# Patient Record
Sex: MALE | Race: WHITE | NOT HISPANIC OR LATINO | Employment: UNEMPLOYED | URBAN - METROPOLITAN AREA
[De-identification: names, ages, dates, MRNs, and addresses within clinical notes are randomized per-mention and may not be internally consistent; named-entity substitution may affect disease eponyms.]

---

## 2021-04-21 RX ORDER — SODIUM, POTASSIUM,MAG SULFATES 17.5-3.13G
2 SOLUTION, RECONSTITUTED, ORAL ORAL TAKE AS DIRECTED
Qty: 354 ML | Refills: 0 | Status: SHIPPED | OUTPATIENT
Start: 2021-04-21 | End: 2021-05-12

## 2021-05-08 PROBLEM — F41.1 GAD (GENERALIZED ANXIETY DISORDER): Status: ACTIVE | Noted: 2021-05-08

## 2021-05-08 PROBLEM — G40.909 SEIZURE DISORDER (HCC): Status: ACTIVE | Noted: 2021-05-08

## 2021-05-08 PROBLEM — J44.9 COPD (CHRONIC OBSTRUCTIVE PULMONARY DISEASE) (HCC): Status: ACTIVE | Noted: 2021-05-08

## 2021-05-08 PROBLEM — R07.2 PRECORDIAL PAIN: Status: ACTIVE | Noted: 2021-05-08

## 2021-05-08 NOTE — PROGRESS NOTES
Subjective   Christina Sheffield is a 56 y.o. male.  Primary Care: Rhea Alegria MD  Referring: Rhea Alegria MD  [address] [place] [address]      Chief Complaint   Patient presents with   • Heart Murmur       Patient Active Problem List    Diagnosis Date Noted   • Abnormal EKG 05/08/2021     Priority: High   • Seizure disorder (CMS/[facility]) 05/08/2021   • COPD (chronic obstructive pulmonary disease) (CMS/[facility]) 05/08/2021   • MARITZA (generalized anxiety disorder) 05/08/2021        History of Present Illness   Is a 56-year-old male who is a moderately poor historian.  He was recently seen by primary care his note was reviewed.  At that time his blood pressure was well controlled and heart rate within normal range.  He had an EKG which shows lateral ischemic changes.  He has chronic mild exertional dyspnea which he attributes to COPD.  He has no current complaint exertional chest pain, denies orthopnea, no PND, no claudication no lower extremity edema.  He has no awareness of tachyarrhythmias, denies dizziness or syncope.  He does not take blood pressure medications.  He denies a history of dyslipidemia.  He exercises daily which includes 30 minutes of weight, walking and biking.    Past Surgical History:   Procedure Laterality Date   • HAND SURGERY Left        The following portions of the patient's history were reviewed and updated as appropriate: allergies, current medications, past family history, past medical history, past social history, past surgical history and problem list.    Allergies   Allergen Reactions   • Penicillins Hives         Current Outpatient Medications   Medication Instructions   • buprenorphine-naloxone (SUBOXONE) 8-2 MG per SL tablet 1 tablet, Sublingual, Daily   • buPROPion XL (WELLBUTRIN XL) 150 mg, Oral, Daily   • fluticasone (FLOVENT HFA) 220 MCG/ACT inhaler 1 puff, Inhalation, 2 Times Daily - RT   • levETIRAcetam (KEPPRA) 500 mg, Oral, 2 Times Daily   • montelukast  "(SINGULAIR) 10 mg, Oral, Nightly         Social History     Socioeconomic History   • Marital status: Single     Spouse name: Not on file   • Number of children: Not on file   • Years of education: Not on file   • Highest education level: Not on file   Tobacco Use   • Smoking status: Former Smoker     Quit date:      Years since quittin.3   • Smokeless tobacco: Never Used   Vaping Use   • Vaping Use: Every day   Substance and Sexual Activity   • Alcohol use: Not Currently   • Drug use: Not Currently       Family History   Problem Relation Age of Onset   • Heart attack Mother 58   • No Known Problems Father        Objective      /60 (BP Location: Right arm, Patient Position: Sitting)   Pulse 67   Temp 98.2 °F (36.8 °C)   Ht 170.2 cm (67\")   Wt 55 kg (121 lb 3.2 oz)   SpO2 96%   BMI 18.98 kg/m²     Constitutional:       Appearance: Well-developed.   Pulmonary:      Effort: Pulmonary effort is normal. No respiratory distress.      Breath sounds: Normal breath sounds. No wheezing. No rales.      Comments: Bases clear  Chest:      Chest wall: Not tender to palpatation.   Cardiovascular:      Normal rate. Regular rhythm.      Murmurs: There is no murmur.      No gallop. No click. No rub.   Pulses:     Intact distal pulses.   Edema:     Peripheral edema absent.   Musculoskeletal: Normal range of motion.         ECG 12 Lead    Date/Time: 2021 3:21 PM  Performed by: Khoi Bazan MD  Authorized by: Khio Bazan MD   Previous ECG: no previous ECG available  Rhythm: sinus rhythm  Rate: normal  BPM: 67  Conduction: conduction normal  ST Segments: ST segments normal  T Waves: T waves normal  QRS axis: right  Other: no other findings    Clinical impression: normal ECG            Lab Review:         Result Review:    []  Laboratory  [x]  Radiology carotid duplex study and a chest x-ray from [facility]  [x]  EKG/Telemetry   []  Pathology  [x]  Old records Primary care note dated 2021: Who " presented for medication refills.  Blood pressure that day was 121/69 with heart rate of 79 bpm.   []  Other:        Assessment:   Diagnosis Plan   1. Abnormal EKG  Adult Transthoracic Echo Complete W/ Cont if Necessary Per Protocol   Stress Test With Myocardial Perfusion One Day      2. Healthcare maintenance  Comprehensive Metabolic Panel    Lipid Panel   3. Exertional dyspnea and chest tightness possible anginal equivalent Adult Transthoracic Echo Complete W/ Cont if Necessary Per Protocol    Stress Test With Myocardial Perfusion One Day  Aspirin 81 mg daily      Plan:  The patient with cardiac risk factors of age, male sex, former heavy smoking and strong family history of CAD. His lipid status is not known.  He has chronic dyspnea due to COPD however has been experiencing shortness of breath and chest tightness with activities which he attributes to his COPD. During routine evaluation at PCP office he was noted to have significant ECG abnormalities of anterior wall ischemia. EKG in our office today is within normal limits.  I have recommended echocardiogram and Cardiolite stress test.  We will check his lipid panel and CMP and have advised him to start taking aspirin 1 tablet daily.  Continue all other current medications.   Follow-up in 1 month, sooner as needed.  Thank you for allowing us to participate in the care of your patient.     Scribed for Khoi Bazan MD by Electronically signed by Electronically signed by ERMA Gamez, 05/12/21, 11:18 AM EDT.      I, Khoi Bazan MD, personally performed the services described in this documentation as scribed by the above named individual in my presence, and it is both accurate and complete.  5/12/2021  12:15 EDT

## 2021-05-12 ENCOUNTER — CONSULT (OUTPATIENT)
Dept: CARDIOLOGY | Facility: CLINIC | Age: 56
End: 2021-05-12

## 2021-05-12 ENCOUNTER — LAB (OUTPATIENT)
Dept: LAB | Facility: HOSPITAL | Age: 56
End: 2021-05-12

## 2021-05-12 VITALS
HEART RATE: 67 BPM | SYSTOLIC BLOOD PRESSURE: 130 MMHG | DIASTOLIC BLOOD PRESSURE: 60 MMHG | OXYGEN SATURATION: 96 % | WEIGHT: 121.2 LBS | TEMPERATURE: 98.2 F | HEIGHT: 67 IN | BODY MASS INDEX: 19.02 KG/M2

## 2021-05-12 DIAGNOSIS — R07.2 PRECORDIAL PAIN: ICD-10-CM

## 2021-05-12 DIAGNOSIS — Z00.00 HEALTHCARE MAINTENANCE: ICD-10-CM

## 2021-05-12 DIAGNOSIS — R94.31 ABNORMAL EKG: Primary | ICD-10-CM

## 2021-05-12 LAB
ALBUMIN SERPL-MCNC: 4.2 G/DL (ref 3.5–5.2)
ALBUMIN/GLOB SERPL: 1.6 G/DL
ALP SERPL-CCNC: 79 U/L (ref 39–117)
ALT SERPL W P-5'-P-CCNC: 8 U/L (ref 1–41)
ANION GAP SERPL CALCULATED.3IONS-SCNC: 10.1 MMOL/L (ref 5–15)
AST SERPL-CCNC: 17 U/L (ref 1–40)
BILIRUB SERPL-MCNC: 0.2 MG/DL (ref 0–1.2)
BUN SERPL-MCNC: 6 MG/DL (ref 6–20)
BUN/CREAT SERPL: 8.3 (ref 7–25)
CALCIUM SPEC-SCNC: 9.1 MG/DL (ref 8.6–10.5)
CHLORIDE SERPL-SCNC: 102 MMOL/L (ref 98–107)
CHOLEST SERPL-MCNC: 171 MG/DL (ref 0–200)
CO2 SERPL-SCNC: 25.9 MMOL/L (ref 22–29)
CREAT SERPL-MCNC: 0.72 MG/DL (ref 0.76–1.27)
GFR SERPL CREATININE-BSD FRML MDRD: 113 ML/MIN/1.73
GLOBULIN UR ELPH-MCNC: 2.6 GM/DL
GLUCOSE SERPL-MCNC: 94 MG/DL (ref 65–99)
HDLC SERPL-MCNC: 56 MG/DL (ref 40–60)
LDLC SERPL CALC-MCNC: 101 MG/DL (ref 0–100)
LDLC/HDLC SERPL: 1.78 {RATIO}
POTASSIUM SERPL-SCNC: 4.6 MMOL/L (ref 3.5–5.2)
PROT SERPL-MCNC: 6.8 G/DL (ref 6–8.5)
SODIUM SERPL-SCNC: 138 MMOL/L (ref 136–145)
TRIGL SERPL-MCNC: 76 MG/DL (ref 0–150)
VLDLC SERPL-MCNC: 14 MG/DL (ref 5–40)

## 2021-05-12 PROCEDURE — 80061 LIPID PANEL: CPT | Performed by: PHYSICIAN ASSISTANT

## 2021-05-12 PROCEDURE — 80053 COMPREHEN METABOLIC PANEL: CPT | Performed by: PHYSICIAN ASSISTANT

## 2021-05-12 PROCEDURE — 36415 COLL VENOUS BLD VENIPUNCTURE: CPT | Performed by: INTERNAL MEDICINE

## 2021-05-12 PROCEDURE — 99243 OFF/OP CNSLTJ NEW/EST LOW 30: CPT | Performed by: INTERNAL MEDICINE

## 2021-05-12 PROCEDURE — 93000 ELECTROCARDIOGRAM COMPLETE: CPT | Performed by: INTERNAL MEDICINE

## 2021-05-12 RX ORDER — MONTELUKAST SODIUM 10 MG/1
10 TABLET ORAL NIGHTLY
COMMUNITY

## 2021-05-12 RX ORDER — BUPRENORPHINE HYDROCHLORIDE AND NALOXONE HYDROCHLORIDE DIHYDRATE 8; 2 MG/1; MG/1
1 TABLET SUBLINGUAL DAILY
COMMUNITY

## 2021-05-12 RX ORDER — LEVETIRACETAM 500 MG/1
500 TABLET ORAL 2 TIMES DAILY
COMMUNITY

## 2021-05-12 RX ORDER — FLUTICASONE PROPIONATE 220 UG/1
1 AEROSOL, METERED RESPIRATORY (INHALATION)
COMMUNITY

## 2021-05-12 RX ORDER — BUPROPION HYDROCHLORIDE 150 MG/1
150 TABLET ORAL DAILY
COMMUNITY
Start: 2021-04-20

## 2021-06-18 DIAGNOSIS — Z01.812 BLOOD TESTS PRIOR TO TREATMENT OR PROCEDURE: Primary | ICD-10-CM
